# Patient Record
Sex: MALE
[De-identification: names, ages, dates, MRNs, and addresses within clinical notes are randomized per-mention and may not be internally consistent; named-entity substitution may affect disease eponyms.]

---

## 2021-06-04 ENCOUNTER — DASHBOARD ENCOUNTERS (OUTPATIENT)
Age: 86
End: 2021-06-04

## 2021-06-04 ENCOUNTER — OFFICE VISIT (OUTPATIENT)
Dept: URBAN - METROPOLITAN AREA CLINIC 98 | Facility: CLINIC | Age: 86
End: 2021-06-04
Payer: MEDICARE

## 2021-06-04 VITALS
DIASTOLIC BLOOD PRESSURE: 78 MMHG | WEIGHT: 162 LBS | BODY MASS INDEX: 24.55 KG/M2 | SYSTOLIC BLOOD PRESSURE: 143 MMHG | TEMPERATURE: 97.3 F | HEIGHT: 68 IN

## 2021-06-04 DIAGNOSIS — R93.5 ABNORMAL MRI OF ABDOMEN: ICD-10-CM

## 2021-06-04 DIAGNOSIS — D12.6 ADENOMATOUS POLYP OF COLON, UNSPECIFIED PART OF COLON: ICD-10-CM

## 2021-06-04 DIAGNOSIS — Z12.11 COLON CANCER SCREENING: ICD-10-CM

## 2021-06-04 PROBLEM — 92065004 BENIGN NEOPLASM OF COLON: Status: ACTIVE | Noted: 2021-06-04

## 2021-06-04 PROCEDURE — G8427 DOCREV CUR MEDS BY ELIG CLIN: HCPCS | Performed by: INTERNAL MEDICINE

## 2021-06-04 PROCEDURE — G8482 FLU IMMUNIZE ORDER/ADMIN: HCPCS | Performed by: INTERNAL MEDICINE

## 2021-06-04 PROCEDURE — 3017F COLORECTAL CA SCREEN DOC REV: CPT | Performed by: INTERNAL MEDICINE

## 2021-06-04 PROCEDURE — G8420 CALC BMI NORM PARAMETERS: HCPCS | Performed by: INTERNAL MEDICINE

## 2021-06-04 PROCEDURE — G9903 PT SCRN TBCO ID AS NON USER: HCPCS | Performed by: INTERNAL MEDICINE

## 2021-06-04 PROCEDURE — 99204 OFFICE O/P NEW MOD 45 MIN: CPT | Performed by: INTERNAL MEDICINE

## 2021-06-04 RX ORDER — CRANBERRY FRUIT EXTRACT 650 MG
AS DIRECTED CAPSULE ORAL
Status: ACTIVE | COMMUNITY

## 2021-06-04 NOTE — HPI-TODAY'S VISIT:
PMH Prostate cancer s/p radiation/surgery, HLD, and brain aneurysm repair 2015 Presents for evaluation of abnormal imaging Recent had PET scan for prostate cancer with incidental finding of pancreatic duct dilation. A follow up MRI showed moderate ductal distention at the level of the pancreas body and tail with abrupt transition.  No pancreatic mass seen Labs reviewed with normal LFTs and CA19.9 He has no h/o pancreatitis  Denies abd pain No change in bowel character Bile ducts WNL on the MRI No abn weight loss

## 2021-06-25 PROBLEM — 416200006: Status: ACTIVE | Noted: 2021-06-04

## 2021-06-29 ENCOUNTER — OFFICE VISIT (OUTPATIENT)
Dept: URBAN - METROPOLITAN AREA MEDICAL CENTER 28 | Facility: MEDICAL CENTER | Age: 86
End: 2021-06-29
Payer: MEDICARE

## 2021-06-29 DIAGNOSIS — R93.3 ABN FINDINGS-GI TRACT: ICD-10-CM

## 2021-06-29 DIAGNOSIS — K86.89 ATROPHIC PANCREAS: ICD-10-CM

## 2021-06-29 PROCEDURE — 43259 EGD US EXAM DUODENUM/JEJUNUM: CPT | Performed by: INTERNAL MEDICINE

## 2021-06-29 RX ORDER — CRANBERRY FRUIT EXTRACT 650 MG
AS DIRECTED CAPSULE ORAL
Status: ACTIVE | COMMUNITY